# Patient Record
Sex: FEMALE | Race: WHITE | Employment: UNEMPLOYED | URBAN - NONMETROPOLITAN AREA
[De-identification: names, ages, dates, MRNs, and addresses within clinical notes are randomized per-mention and may not be internally consistent; named-entity substitution may affect disease eponyms.]

---

## 2024-05-23 ENCOUNTER — OFFICE VISIT (OUTPATIENT)
Dept: URGENT CARE | Facility: CLINIC | Age: 49
End: 2024-05-23
Payer: COMMERCIAL

## 2024-05-23 VITALS
OXYGEN SATURATION: 97 % | RESPIRATION RATE: 20 BRPM | DIASTOLIC BLOOD PRESSURE: 108 MMHG | HEART RATE: 104 BPM | SYSTOLIC BLOOD PRESSURE: 178 MMHG | WEIGHT: 155 LBS | TEMPERATURE: 97.7 F

## 2024-05-23 DIAGNOSIS — Z71.1 PHYSICALLY WELL BUT WORRIED: Primary | ICD-10-CM

## 2024-05-23 PROCEDURE — G0382 LEV 3 HOSP TYPE B ED VISIT: HCPCS

## 2024-05-23 NOTE — PROGRESS NOTES
[facility]        NAME: Doretha Salazar is a 49 y.o. female  : 1975    MRN: [id number]  DATE: May 23, 2024  TIME: 7:09 PM    Assessment and Plan   Physically well but worried [Z71.1]  1. Physically well but worried          Patient states that she was using a Q-tip in her right ear yesterday when the Q-tip came out and the cotton was not on the Q-tip.  On examination of ear, the ear canal is completely clear.  There is no perforation, drainage, foreign body, cerumen impaction.  There is no bulging or erythema of the right ear.  No pain expressed by patient and no tenderness on exam.  No procedures performed at this time.    Patient Instructions     There are no discharge instructions at this time.  Chief Complaint     Chief Complaint   Patient presents with    Foreign Body in Ear     Possible Q tip in right ear. Was using q tip yesterday and it came out without white tip.         History of Present Illness       49-year-old female presents the clinic for possible foreign body in ear.  States she was cleaning her right ear with a Q-tip when she pulled it out and did not see the cotton on the Q-tip.  She denies any ear pain, drainage, bleeding, decreased hearing, fever, chills, chest pain, shortness of breath.    Foreign Body in Ear        Review of Systems   Review of Systems   Constitutional: Negative.    HENT:  Negative for ear pain and facial swelling.         Possible foreign body in right ear-no pain.   Respiratory: Negative.     Cardiovascular: Negative.    Neurological: Negative.          Current Medications     No current outpatient medications on file.    Current Allergies     Allergies as of 2024    (No Known Allergies)            The following portions of the patient's history were reviewed and updated as appropriate: allergies, current medications, past family history, past medical history, past social history, past surgical history and problem list.     History reviewed. No pertinent  past medical history.    History reviewed. No pertinent surgical history.    History reviewed. No pertinent family history.      Medications have been verified.        Objective   BP (!) 178/108   Pulse 104   Temp 97.7 °F (36.5 °C)   Resp 20   Wt 70.3 kg (155 lb)   SpO2 97%        Physical Exam     Physical Exam  Constitutional:       General: She is not in acute distress.     Appearance: Normal appearance. She is not ill-appearing.   HENT:      Head: Normocephalic and atraumatic.      Right Ear: Tympanic membrane, ear canal and external ear normal. No decreased hearing noted. No drainage, swelling or tenderness. No middle ear effusion. There is no impacted cerumen. No foreign body. No mastoid tenderness. No hemotympanum. Tympanic membrane is not injected, perforated, erythematous, retracted or bulging.      Left Ear: Tympanic membrane, ear canal and external ear normal.      Ears:      Comments: Normal examination of right ear  Cardiovascular:      Rate and Rhythm: Normal rate and regular rhythm.      Pulses: Normal pulses.      Heart sounds: Normal heart sounds.   Pulmonary:      Effort: Pulmonary effort is normal. No respiratory distress.      Breath sounds: Normal breath sounds. No stridor. No wheezing, rhonchi or rales.   Chest:      Chest wall: No tenderness.   Skin:     General: Skin is warm and dry.      Capillary Refill: Capillary refill takes less than 2 seconds.   Neurological:      Mental Status: She is alert and oriented to person, place, and time.   Psychiatric:         Mood and Affect: Mood normal.